# Patient Record
Sex: FEMALE | Race: WHITE | NOT HISPANIC OR LATINO | Employment: FULL TIME | URBAN - METROPOLITAN AREA
[De-identification: names, ages, dates, MRNs, and addresses within clinical notes are randomized per-mention and may not be internally consistent; named-entity substitution may affect disease eponyms.]

---

## 2023-12-29 ENCOUNTER — OFFICE VISIT (OUTPATIENT)
Dept: PRIMARY CARE | Facility: CLINIC | Age: 68
End: 2023-12-29
Payer: MEDICARE

## 2023-12-29 VITALS
SYSTOLIC BLOOD PRESSURE: 120 MMHG | WEIGHT: 154 LBS | OXYGEN SATURATION: 98 % | HEART RATE: 75 BPM | BODY MASS INDEX: 26.03 KG/M2 | DIASTOLIC BLOOD PRESSURE: 78 MMHG

## 2023-12-29 DIAGNOSIS — K64.8 INTERNAL HEMORRHOID, BLEEDING: Primary | ICD-10-CM

## 2023-12-29 DIAGNOSIS — Z12.11 COLON CANCER SCREENING: ICD-10-CM

## 2023-12-29 PROCEDURE — 1160F RVW MEDS BY RX/DR IN RCRD: CPT | Performed by: FAMILY MEDICINE

## 2023-12-29 PROCEDURE — 99213 OFFICE O/P EST LOW 20 MIN: CPT | Performed by: FAMILY MEDICINE

## 2023-12-29 PROCEDURE — 1159F MED LIST DOCD IN RCRD: CPT | Performed by: FAMILY MEDICINE

## 2023-12-29 PROCEDURE — 1036F TOBACCO NON-USER: CPT | Performed by: FAMILY MEDICINE

## 2023-12-29 RX ORDER — HYDROCORTISONE 25 MG/G
CREAM TOPICAL 4 TIMES DAILY PRN
Qty: 30 G | Refills: 1 | Status: SHIPPED | OUTPATIENT
Start: 2023-12-29 | End: 2024-06-11 | Stop reason: SDUPTHER

## 2023-12-29 NOTE — PROGRESS NOTES
"Subjective   Patient ID: Yulia Johnson is a 68 y.o. female who presents for Hemorrhoids (Patient believes she has hemorrhoids.  Noticing blood when wipes and in stool. ).  She started to have sxs about 3 months ago.  It started when she was coughing a lot from a URI.  The hemorrhoids felt like \"bumps first\" then eventually started to bleed a little.  The cough went away but the hemorrhoids did not.  Then 2 weeks ago she flew on an airplane and ate something that did not sit well with her and she had some frequent stool and had increased blood and some mucous in her stool.  OTC prep H wipes and cream have helped a little.  The hemorrhoids are not painful, just irritating at times.      She has never had colon CA screening.      Normal BMs are daily or every other day, but denies constipation.        Histories reviewed      Objective   /78   Pulse 75   Wt 69.9 kg (154 lb)   LMP  (LMP Unknown)   SpO2 98%   BMI 26.03 kg/m²    Physical Exam  Alert adult, NAD  Affect pleasant  Heart RRR no murmur  Lungs CTA bilat  Rectum appears normal, no fissures or external hemorrhoids noted      Problem List Items Addressed This Visit    None  Visit Diagnoses         Codes    Internal hemorrhoid, bleeding    -  Primary K64.8    Relevant Medications    hydrocortisone (Anusol-HC) 2.5 % rectal cream    Colon cancer screening     Z12.11    Relevant Orders    Cologuard® colon cancer screening          Written instructions given to pt as folllows:  Use a small stool for bowel movements (squatty potty)  Avoid toilet paper for 1-2 weeks.  Use warm water and pat dry.  Sitz bath as needed.  Avoid constipation and diarrhea.      Wait at least 3-4 weeks afrter no sign of blood to do the cologuard.    Offered GI referral, pt declines.  If sxs do not resolve 100% she should let me know.  "

## 2023-12-29 NOTE — PATIENT INSTRUCTIONS
Use a small stool for bowel movements (squatty potty)  Avoid toilet paper for 1-2 weeks.  Use warm water and pat dry.  Sitz bath as needed.  Avoid constipation and diarrhea.      Wait at least 3-4 weeks afrter no sign of blood to do the cologuard.

## 2024-04-12 ENCOUNTER — TELEPHONE (OUTPATIENT)
Dept: PRIMARY CARE | Facility: CLINIC | Age: 69
End: 2024-04-12
Payer: MEDICARE

## 2024-04-12 NOTE — TELEPHONE ENCOUNTER
Type of form:  Results - Fecal Test     Received via:  mail    Received from:  [facility]    Form placed:  in your to review folder

## 2024-06-11 ENCOUNTER — OFFICE VISIT (OUTPATIENT)
Dept: PRIMARY CARE | Facility: CLINIC | Age: 69
End: 2024-06-11
Payer: MEDICARE

## 2024-06-11 VITALS
HEART RATE: 80 BPM | HEIGHT: 65 IN | OXYGEN SATURATION: 98 % | SYSTOLIC BLOOD PRESSURE: 127 MMHG | DIASTOLIC BLOOD PRESSURE: 84 MMHG | BODY MASS INDEX: 25.99 KG/M2 | WEIGHT: 156 LBS

## 2024-06-11 DIAGNOSIS — Z00.00 MEDICARE ANNUAL WELLNESS VISIT, SUBSEQUENT: Primary | ICD-10-CM

## 2024-06-11 DIAGNOSIS — Z12.31 ENCOUNTER FOR SCREENING MAMMOGRAM FOR MALIGNANT NEOPLASM OF BREAST: ICD-10-CM

## 2024-06-11 DIAGNOSIS — K64.8 INTERNAL HEMORRHOID, BLEEDING: ICD-10-CM

## 2024-06-11 DIAGNOSIS — Z13.820 SCREENING FOR OSTEOPOROSIS: ICD-10-CM

## 2024-06-11 DIAGNOSIS — Z78.0 POST-MENOPAUSAL: ICD-10-CM

## 2024-06-11 DIAGNOSIS — R29.890 LOSS OF HEIGHT: ICD-10-CM

## 2024-06-11 PROBLEM — H52.13 MYOPIA, BILATERAL: Status: ACTIVE | Noted: 2022-08-30

## 2024-06-11 PROBLEM — H25.13 NUCLEAR SCLEROSIS OF BOTH EYES: Status: ACTIVE | Noted: 2022-08-30

## 2024-06-11 PROCEDURE — 1160F RVW MEDS BY RX/DR IN RCRD: CPT | Performed by: FAMILY MEDICINE

## 2024-06-11 PROCEDURE — G0446 INTENS BEHAVE THER CARDIO DX: HCPCS | Performed by: FAMILY MEDICINE

## 2024-06-11 PROCEDURE — 99497 ADVNCD CARE PLAN 30 MIN: CPT | Performed by: FAMILY MEDICINE

## 2024-06-11 PROCEDURE — 1123F ACP DISCUSS/DSCN MKR DOCD: CPT | Performed by: FAMILY MEDICINE

## 2024-06-11 PROCEDURE — 1158F ADVNC CARE PLAN TLK DOCD: CPT | Performed by: FAMILY MEDICINE

## 2024-06-11 PROCEDURE — 1036F TOBACCO NON-USER: CPT | Performed by: FAMILY MEDICINE

## 2024-06-11 PROCEDURE — 1159F MED LIST DOCD IN RCRD: CPT | Performed by: FAMILY MEDICINE

## 2024-06-11 PROCEDURE — 1170F FXNL STATUS ASSESSED: CPT | Performed by: FAMILY MEDICINE

## 2024-06-11 PROCEDURE — G0439 PPPS, SUBSEQ VISIT: HCPCS | Performed by: FAMILY MEDICINE

## 2024-06-11 RX ORDER — HYDROCORTISONE 25 MG/G
CREAM TOPICAL 4 TIMES DAILY PRN
Qty: 30 G | Refills: 1 | Status: SHIPPED | OUTPATIENT
Start: 2024-06-11 | End: 2025-06-11

## 2024-06-11 RX ORDER — MUPIROCIN 20 MG/G
OINTMENT TOPICAL 2 TIMES DAILY
COMMUNITY
Start: 2024-05-20

## 2024-06-11 RX ORDER — HYDROCORTISONE 25 MG/G
CREAM TOPICAL 4 TIMES DAILY PRN
Qty: 30 G | Refills: 1 | Status: SHIPPED | OUTPATIENT
Start: 2024-06-11 | End: 2024-06-11 | Stop reason: SDUPTHER

## 2024-06-11 ASSESSMENT — PATIENT HEALTH QUESTIONNAIRE - PHQ9
2. FEELING DOWN, DEPRESSED OR HOPELESS: NOT AT ALL
1. LITTLE INTEREST OR PLEASURE IN DOING THINGS: NOT AT ALL
2. FEELING DOWN, DEPRESSED OR HOPELESS: NOT AT ALL
SUM OF ALL RESPONSES TO PHQ9 QUESTIONS 1 AND 2: 0
SUM OF ALL RESPONSES TO PHQ9 QUESTIONS 1 AND 2: 0
1. LITTLE INTEREST OR PLEASURE IN DOING THINGS: NOT AT ALL

## 2024-06-11 ASSESSMENT — ACTIVITIES OF DAILY LIVING (ADL)
DOING_HOUSEWORK: INDEPENDENT
GROCERY_SHOPPING: INDEPENDENT
DRESSING: INDEPENDENT
BATHING: INDEPENDENT
MANAGING_FINANCES: INDEPENDENT
TAKING_MEDICATION: INDEPENDENT

## 2024-06-11 NOTE — PATIENT INSTRUCTIONS
Vaccines you need:  Pneumovax and Prevnar pneumonia vaccine one year later  Shingrix to prevent shingles, 2 shots, three months apart  TDaP (Tetanus, diptheria, pertussis) every 10 years

## 2024-06-11 NOTE — PROGRESS NOTES
"Subjective   Reason for Visit: Yulia Johnson is an 68 y.o. female here for a Medicare Wellness visit.          Last mammogram more than 5 years ago  Cologuard 2024-need to get results  Labs 2021        Reviewed all medications by prescribing practitioner or clinical pharmacist (such as prescriptions, OTCs, herbal therapies and supplements) and documented in the medical record.    HPI    Patient Care Team:  Tessa Gray MD as PCP - General (Family Medicine)  Tessa Gray MD as PCP - [employer] PCP     Review of Systems    Objective   Vitals:  /84   Pulse 80   Ht 1.651 m (5' 5\")   Wt 70.8 kg (156 lb)   LMP  (LMP Unknown)   SpO2 98%   BMI 25.96 kg/m²       Physical Exam    Alert adult, NAD, body wt normal  Affect pleasant and appropriate  Eyes: PERRLA, EOMI, clear bilat  Nose clear  Neck supple, No LAD, No thyromegaly  Heart RRR no murmur  Lungs CTA bilat  Abdomen: pos BS, soft NT/ND  Skin warm dry and clear  Neuro grossly normal  Gait WNL    Cardiac Risk score calculated and discussed.  Risk factors for CAD reviewed including family hx and pt's current habits.  Pt has ASCVD 10 year risk calculated at 5.6% when optimal for her age is 5%.  Reviewed her risk factors individually. (16 minutes)    Advanced Planning reviewed.  Pt has a living will and Healthcare POA, reviewed pt's wishes.  Pt remains a full code at this time.  (16 minutes)      Assessment/Plan   Problem List Items Addressed This Visit    None  Visit Diagnoses       Medicare annual wellness visit, subsequent    -  Primary    Internal hemorrhoid, bleeding        Relevant Medications    hydrocortisone (Anusol-HC) 2.5 % rectal cream    Encounter for screening mammogram for malignant neoplasm of breast        Relevant Orders    BI mammo bilateral screening tomosynthesis    Screening for osteoporosis        Relevant Orders    XR DEXA bone density    Loss of height        Relevant Orders    XR DEXA bone density    " Post-menopausal        Relevant Orders    XR DEXA bone density        Discussed vaccines at length, see pt instrx

## 2025-01-21 PROCEDURE — 87206 SMEAR FLUORESCENT/ACID STAI: CPT

## 2025-01-21 PROCEDURE — 87102 FUNGUS ISOLATION CULTURE: CPT

## 2025-01-28 ENCOUNTER — LAB REQUISITION (OUTPATIENT)
Dept: LAB | Facility: HOSPITAL | Age: 70
End: 2025-01-28
Payer: MEDICARE

## 2025-01-28 DIAGNOSIS — B35.1 TINEA UNGUIUM: ICD-10-CM

## 2025-01-28 DIAGNOSIS — L30.8 OTHER SPECIFIED DERMATITIS: ICD-10-CM

## 2025-02-15 ENCOUNTER — ANCILLARY PROCEDURE (OUTPATIENT)
Dept: URGENT CARE | Age: 70
End: 2025-02-15
Payer: MEDICARE

## 2025-02-15 ENCOUNTER — OFFICE VISIT (OUTPATIENT)
Dept: URGENT CARE | Age: 70
End: 2025-02-15
Payer: MEDICARE

## 2025-02-15 VITALS
OXYGEN SATURATION: 98 % | SYSTOLIC BLOOD PRESSURE: 111 MMHG | DIASTOLIC BLOOD PRESSURE: 73 MMHG | TEMPERATURE: 98 F | BODY MASS INDEX: 25.49 KG/M2 | HEART RATE: 93 BPM | WEIGHT: 153 LBS | RESPIRATION RATE: 18 BRPM | HEIGHT: 65 IN

## 2025-02-15 DIAGNOSIS — J40 BRONCHITIS: Primary | ICD-10-CM

## 2025-02-15 DIAGNOSIS — R50.9 FEVER, UNSPECIFIED FEVER CAUSE: ICD-10-CM

## 2025-02-15 DIAGNOSIS — R05.1 ACUTE COUGH: ICD-10-CM

## 2025-02-15 PROCEDURE — 71046 X-RAY EXAM CHEST 2 VIEWS: CPT

## 2025-02-15 RX ORDER — KETOCONAZOLE 20 MG/G
CREAM TOPICAL
COMMUNITY
Start: 2025-01-21

## 2025-02-15 RX ORDER — AZITHROMYCIN 250 MG/1
TABLET, FILM COATED ORAL
Qty: 6 TABLET | Refills: 0 | Status: SHIPPED | OUTPATIENT
Start: 2025-02-15 | End: 2025-02-20

## 2025-02-15 ASSESSMENT — ENCOUNTER SYMPTOMS
COUGH: 1
DEPRESSION: 0

## 2025-02-15 ASSESSMENT — PATIENT HEALTH QUESTIONNAIRE - PHQ9
2. FEELING DOWN, DEPRESSED OR HOPELESS: NOT AT ALL
1. LITTLE INTEREST OR PLEASURE IN DOING THINGS: NOT AT ALL
SUM OF ALL RESPONSES TO PHQ9 QUESTIONS 1 & 2: 0

## 2025-02-15 NOTE — PROGRESS NOTES
Subjective   Patient ID: Yulia Johnson is a 69 y.o. female. They present today with a chief complaint of Nasal Congestion (Started 2 weeks ago ) and Cough (Taking Mucin Ex and Tylenol, Ibuprofen ).    History of Present Illness  Subjective  Yulia Johnson is a 69 y.o. female here for evaluation of a cough. The cough is productive of green/yellow sputum, harsh and is aggravated by nothing. Onset of symptoms was 2 weeks ago, unchanged since that time. Associated symptoms include fever, postnasal drip, and sputum production. Denies sore throat, nasal congestion, rhinorrhea, sinus pain/pressure, wheezing, shortness of breath. Patient does not have a history of asthma. Patient has not had recent travel. Patient does not have a history of smoking. Patient has not had a previous chest x-ray.     Review of Systems   Constitutional:  Endorses fever, chills, malaise, fatigue   ENT: See HPI  Respiratory:  See HPI    Cardiovascular:  Denies chest pain, palpitations, syncope, lightheadedness, dizziness.    Gastrointestinal:  Denies abdominal pain, nausea, vomiting, diarrhea.    Integumentary:  Denies rash.    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 She has never been exposed to tobacco smoke. She has never used smokeless tobacco. She reports that she does not currently use alcohol. She reports that she does not use drugs.    Review of Systems  Review of Systems   Respiratory:   "Positive for cough.                                   Objective    Vitals:    02/15/25 1455   BP: 111/73   BP Location: Left arm   Patient Position: Sitting   Pulse: 93   Resp: 18   Temp: 36.7 °C (98 °F)   SpO2: 98%   Weight: 69.4 kg (153 lb)   Height: 1.651 m (5' 5\")     No LMP recorded (lmp unknown). Patient is postmenopausal.    Physical Exam  Vitals and nursing note reviewed.   Constitutional:       General: She is not in acute distress.     Appearance: Normal appearance. She is normal weight. She is not ill-appearing, toxic-appearing or diaphoretic.   HENT:      Head: Normocephalic and atraumatic.      Nose: No congestion or rhinorrhea.      Mouth/Throat:      Mouth: Mucous membranes are moist.      Pharynx: Oropharynx is clear. No oropharyngeal exudate or posterior oropharyngeal erythema.   Eyes:      General: No scleral icterus.        Right eye: No discharge.         Left eye: No discharge.      Conjunctiva/sclera: Conjunctivae normal.      Pupils: Pupils are equal, round, and reactive to light.   Cardiovascular:      Rate and Rhythm: Normal rate and regular rhythm.      Pulses: Normal pulses.      Heart sounds: Normal heart sounds.   Pulmonary:      Effort: Pulmonary effort is normal. No respiratory distress.      Breath sounds: Normal breath sounds. No stridor. No wheezing, rhonchi or rales.   Chest:      Chest wall: No tenderness.   Musculoskeletal:         General: Normal range of motion.      Cervical back: No rigidity or tenderness.   Lymphadenopathy:      Cervical: No cervical adenopathy.   Skin:     General: Skin is warm and dry.      Coloration: Skin is not jaundiced or pale.      Findings: No bruising, erythema or rash.   Neurological:      General: No focal deficit present.      Mental Status: She is alert and oriented to person, place, and time.      Sensory: No sensory deficit.         Procedures    Point of Care Test & Imaging Results from this visit  No results found for this visit on " 02/15/25.   No results found.    Diagnostic study results (if any) were reviewed by SON Mays.    Assessment/Plan   Allergies, medications, history, and pertinent labs/EKGs/Imaging reviewed by SON Mays.     Medical Decision Making     Based on history and?physical?exam findings, negative chest X-ray with worsening cough with a fever- concerned for atypical pneumonia. No evidence of sepsis or other acute cardiopulmonary process.? Will start on antibiotics today.? Encouraged to continue symptomatic and supportive care measures.? Advised to schedule follow-up with primary care provider in 1 week.? Return with any new or worsening symptoms.? Discussed when to seek emergent care. Patient agreeable with plan and?verbalized?understanding.     Orders and Diagnoses  There are no diagnoses linked to this encounter.    Medical Admin Record      Patient disposition: Home    Electronically signed by SON Mays  2:59 PM

## 2025-02-17 LAB
CALCOFLUOR WHITE SPEC: ABNORMAL
FUNGUS SPEC CULT: ABNORMAL

## 2025-06-25 ENCOUNTER — APPOINTMENT (OUTPATIENT)
Dept: PRIMARY CARE | Facility: CLINIC | Age: 70
End: 2025-06-25
Payer: MEDICARE

## 2025-08-19 DIAGNOSIS — Z12.31 ENCOUNTER FOR SCREENING MAMMOGRAM FOR BREAST CANCER: ICD-10-CM

## 2025-08-28 ENCOUNTER — APPOINTMENT (OUTPATIENT)
Dept: PRIMARY CARE | Facility: CLINIC | Age: 70
End: 2025-08-28
Payer: MEDICARE

## 2025-08-28 VITALS
SYSTOLIC BLOOD PRESSURE: 116 MMHG | DIASTOLIC BLOOD PRESSURE: 68 MMHG | HEART RATE: 77 BPM | HEIGHT: 65 IN | BODY MASS INDEX: 26.06 KG/M2 | OXYGEN SATURATION: 100 % | WEIGHT: 156.4 LBS

## 2025-08-28 DIAGNOSIS — Z00.00 MEDICARE ANNUAL WELLNESS VISIT, SUBSEQUENT: Primary | ICD-10-CM

## 2025-08-28 DIAGNOSIS — Z12.11 SCREENING FOR COLON CANCER: ICD-10-CM

## 2025-08-28 DIAGNOSIS — Z78.0 POST-MENOPAUSAL: ICD-10-CM

## 2025-08-28 DIAGNOSIS — R29.890 LOSS OF HEIGHT: ICD-10-CM

## 2025-08-28 DIAGNOSIS — K64.8 INTERNAL HEMORRHOID, BLEEDING: ICD-10-CM

## 2025-08-28 DIAGNOSIS — Z13.1 SCREENING FOR DIABETES MELLITUS: ICD-10-CM

## 2025-08-28 DIAGNOSIS — Z13.220 SCREENING FOR HYPERLIPIDEMIA: ICD-10-CM

## 2025-08-28 DIAGNOSIS — Z13.820 SCREENING FOR OSTEOPOROSIS: ICD-10-CM

## 2025-08-28 PROCEDURE — 90677 PCV20 VACCINE IM: CPT | Performed by: FAMILY MEDICINE

## 2025-08-28 PROCEDURE — 1158F ADVNC CARE PLAN TLK DOCD: CPT | Performed by: FAMILY MEDICINE

## 2025-08-28 PROCEDURE — 3008F BODY MASS INDEX DOCD: CPT | Performed by: FAMILY MEDICINE

## 2025-08-28 PROCEDURE — 90715 TDAP VACCINE 7 YRS/> IM: CPT | Performed by: FAMILY MEDICINE

## 2025-08-28 PROCEDURE — G0009 ADMIN PNEUMOCOCCAL VACCINE: HCPCS | Performed by: FAMILY MEDICINE

## 2025-08-28 PROCEDURE — 99497 ADVNCD CARE PLAN 30 MIN: CPT | Performed by: FAMILY MEDICINE

## 2025-08-28 PROCEDURE — G0439 PPPS, SUBSEQ VISIT: HCPCS | Performed by: FAMILY MEDICINE

## 2025-08-28 PROCEDURE — 1159F MED LIST DOCD IN RCRD: CPT | Performed by: FAMILY MEDICINE

## 2025-08-28 PROCEDURE — G0446 INTENS BEHAVE THER CARDIO DX: HCPCS | Performed by: FAMILY MEDICINE

## 2025-08-28 PROCEDURE — 1160F RVW MEDS BY RX/DR IN RCRD: CPT | Performed by: FAMILY MEDICINE

## 2025-08-28 PROCEDURE — 1170F FXNL STATUS ASSESSED: CPT | Performed by: FAMILY MEDICINE

## 2025-08-28 PROCEDURE — 90471 IMMUNIZATION ADMIN: CPT | Performed by: FAMILY MEDICINE

## 2025-08-28 PROCEDURE — 1036F TOBACCO NON-USER: CPT | Performed by: FAMILY MEDICINE

## 2025-08-28 RX ORDER — HYDROCORTISONE 25 MG/G
CREAM TOPICAL 4 TIMES DAILY PRN
Qty: 84 G | Refills: 4 | Status: SHIPPED | OUTPATIENT
Start: 2025-08-28 | End: 2026-08-28

## 2025-08-28 ASSESSMENT — PATIENT HEALTH QUESTIONNAIRE - PHQ9
SUM OF ALL RESPONSES TO PHQ9 QUESTIONS 1 AND 2: 0
1. LITTLE INTEREST OR PLEASURE IN DOING THINGS: NOT AT ALL
1. LITTLE INTEREST OR PLEASURE IN DOING THINGS: NOT AT ALL
2. FEELING DOWN, DEPRESSED OR HOPELESS: NOT AT ALL
2. FEELING DOWN, DEPRESSED OR HOPELESS: NOT AT ALL
SUM OF ALL RESPONSES TO PHQ9 QUESTIONS 1 AND 2: 0

## 2025-08-28 ASSESSMENT — ACTIVITIES OF DAILY LIVING (ADL)
DRESSING: INDEPENDENT
DOING_HOUSEWORK: INDEPENDENT
GROCERY_SHOPPING: INDEPENDENT
BATHING: INDEPENDENT
MANAGING_FINANCES: INDEPENDENT
TAKING_MEDICATION: INDEPENDENT